# Patient Record
Sex: MALE | Race: WHITE | HISPANIC OR LATINO | Employment: FULL TIME | URBAN - METROPOLITAN AREA
[De-identification: names, ages, dates, MRNs, and addresses within clinical notes are randomized per-mention and may not be internally consistent; named-entity substitution may affect disease eponyms.]

---

## 2018-07-21 ENCOUNTER — HOSPITAL ENCOUNTER (EMERGENCY)
Facility: HOSPITAL | Age: 38
Discharge: HOME | End: 2018-07-21
Attending: EMERGENCY MEDICINE

## 2018-07-21 VITALS
RESPIRATION RATE: 18 BRPM | HEIGHT: 68 IN | DIASTOLIC BLOOD PRESSURE: 95 MMHG | HEART RATE: 96 BPM | WEIGHT: 245 LBS | BODY MASS INDEX: 37.13 KG/M2 | SYSTOLIC BLOOD PRESSURE: 150 MMHG | TEMPERATURE: 98 F | OXYGEN SATURATION: 97 %

## 2018-07-21 DIAGNOSIS — K57.92 DIVERTICULITIS: Primary | ICD-10-CM

## 2018-07-21 LAB
ALBUMIN SERPL BCP-MCNC: 4 G/DL
ALP SERPL-CCNC: 129 U/L
ALT SERPL W/O P-5'-P-CCNC: 51 U/L
ANION GAP SERPL CALC-SCNC: 11 MMOL/L
AST SERPL-CCNC: 30 U/L
BASOPHILS # BLD AUTO: 0.03 K/UL
BASOPHILS NFR BLD: 0.2 %
BILIRUB SERPL-MCNC: 0.9 MG/DL
BILIRUB UR QL STRIP: NEGATIVE
BUN SERPL-MCNC: 17 MG/DL
CALCIUM SERPL-MCNC: 9.5 MG/DL
CHLORIDE SERPL-SCNC: 103 MMOL/L
CLARITY UR: CLEAR
CO2 SERPL-SCNC: 23 MMOL/L
COLOR UR: YELLOW
CREAT SERPL-MCNC: 0.9 MG/DL
DIFFERENTIAL METHOD: ABNORMAL
EOSINOPHIL # BLD AUTO: 1.3 K/UL
EOSINOPHIL NFR BLD: 10 %
ERYTHROCYTE [DISTWIDTH] IN BLOOD BY AUTOMATED COUNT: 13.4 %
EST. GFR  (AFRICAN AMERICAN): >60 ML/MIN/1.73 M^2
EST. GFR  (NON AFRICAN AMERICAN): >60 ML/MIN/1.73 M^2
GLUCOSE SERPL-MCNC: 109 MG/DL
GLUCOSE UR QL STRIP: NEGATIVE
HCT VFR BLD AUTO: 47.6 %
HGB BLD-MCNC: 15.9 G/DL
HGB UR QL STRIP: NEGATIVE
KETONES UR QL STRIP: NEGATIVE
LEUKOCYTE ESTERASE UR QL STRIP: NEGATIVE
LIPASE SERPL-CCNC: 29 U/L
LYMPHOCYTES # BLD AUTO: 2.5 K/UL
LYMPHOCYTES NFR BLD: 19.8 %
MCH RBC QN AUTO: 30.6 PG
MCHC RBC AUTO-ENTMCNC: 33.4 G/DL
MCV RBC AUTO: 92 FL
MONOCYTES # BLD AUTO: 1.5 K/UL
MONOCYTES NFR BLD: 12 %
NEUTROPHILS # BLD AUTO: 7.4 K/UL
NEUTROPHILS NFR BLD: 58 %
NITRITE UR QL STRIP: NEGATIVE
PH UR STRIP: 6 [PH] (ref 5–8)
PLATELET # BLD AUTO: 229 K/UL
PMV BLD AUTO: 10.1 FL
POTASSIUM SERPL-SCNC: 3.9 MMOL/L
PROT SERPL-MCNC: 8.2 G/DL
PROT UR QL STRIP: NEGATIVE
RBC # BLD AUTO: 5.2 M/UL
SODIUM SERPL-SCNC: 137 MMOL/L
SP GR UR STRIP: 1.02 (ref 1–1.03)
URN SPEC COLLECT METH UR: NORMAL
UROBILINOGEN UR STRIP-ACNC: 1 EU/DL
WBC # BLD AUTO: 12.81 K/UL

## 2018-07-21 PROCEDURE — 80053 COMPREHEN METABOLIC PANEL: CPT

## 2018-07-21 PROCEDURE — 83690 ASSAY OF LIPASE: CPT

## 2018-07-21 PROCEDURE — 81003 URINALYSIS AUTO W/O SCOPE: CPT

## 2018-07-21 PROCEDURE — 25500020 PHARM REV CODE 255: Performed by: EMERGENCY MEDICINE

## 2018-07-21 PROCEDURE — 85025 COMPLETE CBC W/AUTO DIFF WBC: CPT

## 2018-07-21 PROCEDURE — 99284 EMERGENCY DEPT VISIT MOD MDM: CPT | Mod: 25

## 2018-07-21 RX ORDER — CIPROFLOXACIN 500 MG/1
500 TABLET ORAL 2 TIMES DAILY
Qty: 14 TABLET | Refills: 0 | Status: SHIPPED | OUTPATIENT
Start: 2018-07-21 | End: 2018-07-31

## 2018-07-21 RX ORDER — METRONIDAZOLE 500 MG/1
500 TABLET ORAL 3 TIMES DAILY
Qty: 21 TABLET | Refills: 0 | Status: SHIPPED | OUTPATIENT
Start: 2018-07-21 | End: 2018-07-28

## 2018-07-21 RX ADMIN — IOHEXOL 100 ML: 350 INJECTION, SOLUTION INTRAVENOUS at 10:07

## 2018-07-22 NOTE — ED NOTES
Patient identifiers for Ray Garrido checked and correct.  LOC: The patient is awake, alert and aware of environment with an appropriate affect, the patient is oriented x 3 and speaking appropriately.  APPEARANCE: Patient uncomfortable and in no acute distress, patient is clean and well groomed, patient's clothing are properly fastened.  SKIN: The skin is warm and dry, patient has normal skin turgor and moist mucus membranes, skin intact, no breakdown or brusing noted.  MUSKULOSKELETAL: Patient moving all extremities well, no obvious swelling or deformities noted.  RESPIRATORY: Airway is open and patent, respirations are spontaneous, patient has a normal effort and rate.  ABDOMEN: Tender to palpation.

## 2018-07-22 NOTE — ED PROVIDER NOTES
Encounter Date: 7/21/2018       History     Chief Complaint   Patient presents with    Abdominal Pain     Pt to ED with c/o right sided abdominal pain since yesterday. Pt denies NVD.      Patient is a 38-year-old male who denies past medical history who presents to ED with lower abdominal pain since yesterday.  Patient gives permission for friend to translate conversation.   Patient reports that the pain is more in the right lower quadrant. Patient associates fever and back pain. Patient denies history of any abdominal surgeries.  Patient denies any alleviating is aspirating factors.  Patient denies chills, neck pain/stiffness, headache, dizziness, SOB, chest pain, nausea, vomiting, diarrhea, dysuria, and hematuria.  Patient denies any other complaints at this time.      The history is provided by the patient.     Review of patient's allergies indicates:  No Known Allergies  History reviewed. No pertinent past medical history.  No past surgical history on file.  History reviewed. No pertinent family history.  Social History   Substance Use Topics    Smoking status: Not on file    Smokeless tobacco: Not on file    Alcohol use Not on file     Review of Systems   Constitutional: Positive for fever. Negative for chills.   HENT: Negative for sore throat.    Respiratory: Negative for shortness of breath.    Cardiovascular: Negative for chest pain.   Gastrointestinal: Positive for abdominal pain. Negative for diarrhea, nausea and vomiting.   Genitourinary: Negative for decreased urine volume, difficulty urinating, discharge, dysuria, flank pain, frequency, hematuria, penile pain, penile swelling, scrotal swelling, testicular pain and urgency.   Musculoskeletal: Positive for back pain. Negative for neck pain and neck stiffness.   Skin: Negative for rash.   Neurological: Negative for dizziness, syncope, facial asymmetry, speech difficulty, weakness, light-headedness, numbness and headaches.   Hematological: Does not  bruise/bleed easily.       Physical Exam     Initial Vitals [07/21/18 2114]   BP Pulse Resp Temp SpO2   (!) 155/89 103 20 100.1 °F (37.8 °C) 96 %      MAP       --         Physical Exam    Nursing note and vitals reviewed.  Constitutional: He appears well-developed and well-nourished. He is not diaphoretic. He is active and cooperative.  Non-toxic appearance. He does not have a sickly appearance.   Low-grade temperature    HENT:   Head: Normocephalic.   Nose: Nose normal.   Mouth/Throat: Oropharynx is clear and moist.   Eyes: Lids are normal.   Neck: Trachea normal, normal range of motion, full passive range of motion without pain and phonation normal. Neck supple.   Cardiovascular: Regular rhythm, normal heart sounds and normal pulses. Tachycardia present.    Pt hypertensive, denies any cardiac s/s.    Pulmonary/Chest: Effort normal and breath sounds normal.   Abdominal: Soft. Normal appearance and bowel sounds are normal. He exhibits no distension and no mass. There is tenderness in the right lower quadrant. There is guarding. There is no rigidity, no rebound, no CVA tenderness and no tenderness at McBurney's point.       Neurological: He is alert and oriented to person, place, and time. Gait normal. GCS eye subscore is 4. GCS verbal subscore is 5. GCS motor subscore is 6.   Skin: Skin is warm, dry and intact. Capillary refill takes less than 2 seconds. No rash noted.   Psychiatric: He has a normal mood and affect.         ED Course   Procedures  Labs Reviewed   CBC W/ AUTO DIFFERENTIAL - Abnormal; Notable for the following:        Result Value    WBC 12.81 (*)     Mono # 1.5 (*)     Eos # 1.3 (*)     Eosinophil% 10.0 (*)     All other components within normal limits   COMPREHENSIVE METABOLIC PANEL - Abnormal; Notable for the following:     ALT 51 (*)     All other components within normal limits   URINALYSIS, REFLEX TO URINE CULTURE    Narrative:     Preferred Collection Type->Urine, Clean Catch   LIPASE           Imaging Results          CT Abdomen Pelvis With Contrast (Final result)  Result time 07/21/18 23:09:09    Final result by Mundo Ag MD (07/21/18 23:09:09)                 Impression:      Extensive inflammatory changes in the right lower quadrant surrounding the sigmoid colon in a region of multiple diverticula consistent with acute diverticulitis.  No evidence of abscess or perforation.    Hepatomegaly with hepatic steatosis.      Electronically signed by: Mundo Ag MD  Date:    07/21/2018  Time:    23:09             Narrative:    EXAMINATION:  CT ABDOMEN PELVIS WITH CONTRAST    CLINICAL HISTORY:  Abdominal pain, unspecified;    TECHNIQUE:  Low dose axial images, sagittal and coronal reformations were obtained from the lung bases to the pubic symphysis following the IV administration of 100 mL of Omnipaque 350 .  Oral contrast was not given.    COMPARISON:  None.    FINDINGS:  The visualized portion of the heart is unremarkable.  The lung bases are clear.    Liver is enlarged measuring 21.5 cm and diffusely hypoattenuating in appearance suggestive for diffuse fatty infiltration.  There is no intra-or extrahepatic biliary ductal dilatation.  The gallbladder is unremarkable.  The stomach, pancreas, spleen, and adrenal glands are unremarkable.    Kidneys are functioning with no evidence of hydronephrosis.  Ureters, urinary bladder, and prostate are unremarkable.    Sigmoid colon is mildly tortuous and extends into the right lower quadrant.  Extensive surrounding inflammatory changes are seen surrounding the sigmoid colon in a region of multiple diverticula suggestive for acute diverticulitis.  Inflammatory changes extend to involve adjacent distal ileum.  Appendix is visualized and is unremarkable.  No evidence of bowel obstruction.  No evidence of perforation or abscess.  No free air or free fluid.    Aorta tapers normally.  Retroaortic left renal vein is incidentally noted.    No acute osseous  abnormality identified. Subcutaneous soft tissue structures are unremarkable.                                 Medical Decision Making:   History:   I obtained history from: someone other than patient.       <> Summary of History: Friend    Initial Assessment:   Patient presents to ED with lower abdominal pain since yesterday.  Patient reports the pain is more in right lower quadrant.  Patient appears well, nontoxic.  Patient has low-grade temp.  Normal bowel sounds. Abdomen soft.  Guarding noted. No rebound.   Differential Diagnosis:   Appendicitis, diverticulitis, SBO, pancreatitis  Clinical Tests:   Lab Tests: Ordered and Reviewed  Radiological Study: Ordered and Reviewed  ED Management:  CBC, CMP, lipase, urinalysis, CT abdomen/pelvis, IV   Labs unremarkable.  Urinalysis unremarkable.  CT reveals extensive inflammatory changes in the right lower quadrant surrounding the sigmoid colon in a region of multiple diverticula consistent with acute diverticulitis.  No evidence of abscess or perforation.  Patient is stable, no longer tachycardic, and will be DC home.  The patient will be treated for diverticulitis with prescriptions for Cipro and Flagyl.  Patient instructed to take all of prescribed antibiotics and to follow up with [facility] within 2-3 days.  Return precautions given.    Rx: cipro, flagyl                   Attending Attestation:     Physician Attestation Statement for NP/PA:   I discussed this assessment and plan of this patient with the NP/PA, but I did not personally examine the patient. The face to face encounter was performed by the NP/PA.    Other NP/PA Attestation Additions:    History of Present Illness: 38M with RLQ abd pain since yesterday.    Medical Decision Making: Work up shows acute diverticulitis.  No evidence of acute appendicitis  NO sepsis and pt is tolerating PO.  Treat with PO cipro/flagyl.                    Clinical Impression:   The encounter diagnosis  was Diverticulitis.                             Joe Holliday NP  07/21/18 4124       Renata White MD  07/26/18 6031

## 2018-07-22 NOTE — DISCHARGE INSTRUCTIONS
Please take prescribed antibiotics as labeled.  Follow-up with [facility] within 2-3 days and return to ED if symptoms worsen or change.

## 2018-07-22 NOTE — ED NOTES
Patient presents to ED with c/o rlq abdominal pain starting yesterday evening. Tender to palpation. Also complains of intermittent nausea without vomiting and subjective fever. No hx of any GI problems or surgeries. Denies any  symptoms. Denies chest pain. No shortness of breath. Pt is aaox4, neurologically intact.

## 2024-01-19 ENCOUNTER — HOSPITAL ENCOUNTER (EMERGENCY)
Facility: HOSPITAL | Age: 44
Discharge: HOME | End: 2024-01-19
Attending: EMERGENCY MEDICINE

## 2024-01-19 VITALS
TEMPERATURE: 98 F | DIASTOLIC BLOOD PRESSURE: 110 MMHG | OXYGEN SATURATION: 97 % | HEIGHT: 68 IN | BODY MASS INDEX: 37.89 KG/M2 | WEIGHT: 250 LBS | RESPIRATION RATE: 18 BRPM | SYSTOLIC BLOOD PRESSURE: 186 MMHG | HEART RATE: 90 BPM

## 2024-01-19 DIAGNOSIS — R07.9 CHEST PAIN: ICD-10-CM

## 2024-01-19 LAB
ALBUMIN SERPL BCP-MCNC: 4.4 G/DL (ref 3.5–5.2)
ALP SERPL-CCNC: 133 U/L (ref 55–135)
ALT SERPL W/O P-5'-P-CCNC: 72 U/L (ref 10–44)
ANION GAP SERPL CALC-SCNC: 12 MMOL/L (ref 8–16)
AST SERPL-CCNC: 60 U/L (ref 10–40)
BASOPHILS # BLD AUTO: 0.07 K/UL (ref 0–0.2)
BASOPHILS NFR BLD: 1.1 % (ref 0–1.9)
BILIRUB SERPL-MCNC: 0.9 MG/DL (ref 0.1–1)
BNP SERPL-MCNC: <10 PG/ML (ref 0–99)
BUN SERPL-MCNC: 14 MG/DL (ref 6–20)
CALCIUM SERPL-MCNC: 9.5 MG/DL (ref 8.7–10.5)
CHLORIDE SERPL-SCNC: 98 MMOL/L (ref 95–110)
CO2 SERPL-SCNC: 25 MMOL/L (ref 23–29)
CREAT SERPL-MCNC: 0.9 MG/DL (ref 0.5–1.4)
DIFFERENTIAL METHOD BLD: ABNORMAL
EOSINOPHIL # BLD AUTO: 1.2 K/UL (ref 0–0.5)
EOSINOPHIL NFR BLD: 19.1 % (ref 0–8)
ERYTHROCYTE [DISTWIDTH] IN BLOOD BY AUTOMATED COUNT: 12.1 % (ref 11.5–14.5)
EST. GFR  (NO RACE VARIABLE): >60 ML/MIN/1.73 M^2
GLUCOSE SERPL-MCNC: 199 MG/DL (ref 70–110)
HCT VFR BLD AUTO: 45 % (ref 40–54)
HGB BLD-MCNC: 16 G/DL (ref 14–18)
IMM GRANULOCYTES # BLD AUTO: 0.01 K/UL (ref 0–0.04)
IMM GRANULOCYTES NFR BLD AUTO: 0.2 % (ref 0–0.5)
LYMPHOCYTES # BLD AUTO: 2.3 K/UL (ref 1–4.8)
LYMPHOCYTES NFR BLD: 36.8 % (ref 18–48)
MCH RBC QN AUTO: 30.7 PG (ref 27–31)
MCHC RBC AUTO-ENTMCNC: 35.6 G/DL (ref 32–36)
MCV RBC AUTO: 86 FL (ref 82–98)
MONOCYTES # BLD AUTO: 0.4 K/UL (ref 0.3–1)
MONOCYTES NFR BLD: 6.8 % (ref 4–15)
NEUTROPHILS # BLD AUTO: 2.2 K/UL (ref 1.8–7.7)
NEUTROPHILS NFR BLD: 36 % (ref 38–73)
NRBC BLD-RTO: 0 /100 WBC
PLATELET # BLD AUTO: 229 K/UL (ref 150–450)
PMV BLD AUTO: 9.8 FL (ref 9.2–12.9)
POTASSIUM SERPL-SCNC: 4 MMOL/L (ref 3.5–5.1)
PROT SERPL-MCNC: 8.6 G/DL (ref 6–8.4)
RBC # BLD AUTO: 5.22 M/UL (ref 4.6–6.2)
SODIUM SERPL-SCNC: 135 MMOL/L (ref 136–145)
TROPONIN I SERPL DL<=0.01 NG/ML-MCNC: <0.006 NG/ML (ref 0–0.03)
WBC # BLD AUTO: 6.17 K/UL (ref 3.9–12.7)

## 2024-01-19 PROCEDURE — 84484 ASSAY OF TROPONIN QUANT: CPT | Performed by: PHYSICIAN ASSISTANT

## 2024-01-19 PROCEDURE — 85025 COMPLETE CBC W/AUTO DIFF WBC: CPT | Performed by: PHYSICIAN ASSISTANT

## 2024-01-19 PROCEDURE — 93010 ELECTROCARDIOGRAM REPORT: CPT | Mod: ,,, | Performed by: INTERNAL MEDICINE

## 2024-01-19 PROCEDURE — 83880 ASSAY OF NATRIURETIC PEPTIDE: CPT | Performed by: PHYSICIAN ASSISTANT

## 2024-01-19 PROCEDURE — 80053 COMPREHEN METABOLIC PANEL: CPT | Performed by: PHYSICIAN ASSISTANT

## 2024-01-19 PROCEDURE — 99285 EMERGENCY DEPT VISIT HI MDM: CPT | Mod: 25

## 2024-01-19 PROCEDURE — 93005 ELECTROCARDIOGRAM TRACING: CPT

## 2024-01-19 RX ORDER — NAPROXEN 500 MG/1
500 TABLET ORAL 2 TIMES DAILY WITH MEALS
Qty: 30 TABLET | Refills: 0 | Status: SHIPPED | OUTPATIENT
Start: 2024-01-19

## 2024-01-19 NOTE — ED PROVIDER NOTES
Encounter Date: 1/19/2024       History     Chief Complaint   Patient presents with    Chest Pain     Pt c/o left sided chest pain since 4am Thursday. The pain resolved on it's own later that day and restarted yesterday. The pain radiates to left shoulder and travels down left side of body. Pt denies any nausea or shortness breath. The pain has been constant since last night.      43-year-old male, denying significant past medical history, presents ED with concern of left-sided chest wall pain that began yesterday evening.  Denies any specific injury or trauma.  Pain is sharp, constant, is worse with touch or movements but also occurs at rest.  He did try ibuprofen for symptoms.  Denies any associated cough, shortness of breath, palpitations, lightheadedness or dizziness, abdominal pain, nausea, vomiting, urinary or bowel complaints.  No recent travel or surgery, leg swelling, history of DVT/PE.  Denies significant family history of cardiac complications.  No other acute complaints at this time.    The history is provided by the patient.     Review of patient's allergies indicates:  No Known Allergies  No past medical history on file.  No past surgical history on file.  No family history on file.     Review of Systems   Constitutional:  Negative for chills and fever.   HENT:  Negative for sore throat.    Respiratory:  Negative for cough and shortness of breath.    Cardiovascular:  Positive for chest pain. Negative for palpitations and leg swelling.   Gastrointestinal:  Negative for abdominal pain, diarrhea, nausea and vomiting.   Musculoskeletal:  Negative for back pain, neck pain and neck stiffness.       Physical Exam     Initial Vitals [01/19/24 1152]   BP Pulse Resp Temp SpO2   (!) 186/110 90 18 97.9 °F (36.6 °C) 97 %      MAP       --         Physical Exam    Nursing note and vitals reviewed.  Constitutional: He appears well-developed and well-nourished. He is active. He does not have a sickly appearance. He  does not appear ill. No distress.   HENT:   Head: Normocephalic and atraumatic.   Neck:   Normal range of motion.  Cardiovascular:  Normal rate, regular rhythm and normal heart sounds.           Pulmonary/Chest: Effort normal and breath sounds normal.   Patient is reporting tenderness with palpation of left anterior chest wall.  No skin changes or rashes.  Lungs are clear bilaterally.   Musculoskeletal:      Cervical back: Normal range of motion.     Neurological: He is alert. GCS eye subscore is 4. GCS verbal subscore is 5. GCS motor subscore is 6.   Skin: Skin is warm and dry.   Psychiatric: He has a normal mood and affect. His speech is normal and behavior is normal.         ED Course   Procedures  Labs Reviewed   CBC W/ AUTO DIFFERENTIAL - Abnormal; Notable for the following components:       Result Value    Eos # 1.2 (*)     Gran % 36.0 (*)     Eosinophil % 19.1 (*)     All other components within normal limits   COMPREHENSIVE METABOLIC PANEL - Abnormal; Notable for the following components:    Sodium 135 (*)     Glucose 199 (*)     Total Protein 8.6 (*)     AST 60 (*)     ALT 72 (*)     All other components within normal limits   TROPONIN I   B-TYPE NATRIURETIC PEPTIDE        ECG Results              EKG 12-lead (In process)  Result time 01/19/24 14:11:10      In process by Interface, Lab In [facility] (01/19/24 14:11:10)                   Narrative:    Test Reason : R07.9,    Vent. Rate : 081 BPM     Atrial Rate : 081 BPM     P-R Int : 162 ms          QRS Dur : 152 ms      QT Int : 414 ms       P-R-T Axes : 052 -65 012 degrees     QTc Int : 480 ms    Normal sinus rhythm  Right bundle branch block  Left anterior fascicular block   Bifascicular block   Minimal voltage criteria for LVH, may be normal variant  Abnormal ECG  No previous ECGs available    Referred By: [email or web address]   SELF           Confirmed By:                                   Imaging Results              X-Ray Chest AP Portable (Final result)  Result  time 01/19/24 13:08:52      Final result by Tim England MD (01/19/24 13:08:52)                   Impression:      No acute findings.      Electronically signed by: Tim England MD  Date:    01/19/2024  Time:    13:08               Narrative:    EXAMINATION:  XR CHEST AP PORTABLE    CLINICAL HISTORY:  Chest Pain;    TECHNIQUE:  Single frontal view of the chest was performed.    COMPARISON:  None    FINDINGS:  Cardiomediastinal contour is within normal limits.The lungs are grossly clear.  No pneumothorax.No pleural effusions.                                       Medications - No data to display  Medical Decision Making  Patient presents with concern of left-sided chest pain that began yesterday evening, described as constant, sharp, worse with activities or touch but also occurring at rest.  Afebrile.  He was reporting reproducible tenderness to left anterior chest wall.  Lungs are clear.    DDx:  Including but not limited to musculoskeletal, costochondritis, pleurisy, pneumonia, pneumothorax, pleural effusion, ACS, PE    Amount and/or Complexity of Data Reviewed  Labs: ordered. Decision-making details documented in ED Course.  Radiology: ordered. Decision-making details documented in ED Course.    Risk  Prescription drug management.      Additional MDM:   PERC Rule:   Age is greater than or equal to 50 = 0.0  Heart Rate is greater than or equal to 100 = 0.0  SaO2 on room air < 95% = 0.0  Unilateral leg swelling = 0.0  Hemoptysis = 0.0  Recent surgery or trauma = 0.0  Prior PE or DVT =  0.0  Hormone use = 0.00  PERC Score = 0              ED Course as of 01/19/24 1422   Fri Jan 19, 2024   1323 Troponin I #1  WNL [KS]   1323 CBC auto differential(!)  Unremarkable [KS]   1333 Comprehensive metabolic panel(!)  Hyperglycemia 199. Slight elevation LFT. Total bili WNL. Grossly unremarkable [KS]   1334 X-Ray Chest AP Portable  No acute cardiopulmonary findings [KS]   1415 BNP  WNL [KS]   1418 Patient continues to rest  comfortably and in no apparent distress.  Results were discussed. Low heart score. PERC negative.  Lower concern for acute cardiac event.  I do have higher suspicion symptoms are likely musculoskeletal as patient is reporting pain symptoms are worsened with certain movements and with touch.  Will treat with NSAID.  He was encouraged have close follow-up with PCP for re-evaluation.  ED return precautions were discussed at length.  Patient states his understanding and agrees with plan. [KS]   1419 Patient discussed with attending, Dr. Núñez, who agrees with ED course and dispo. [KS]      ED Course User Index  [KS] Lazaro Chiang PA-C                             Clinical Impression:  Final diagnoses:  [R07.9] Chest pain          ED Disposition Condition    Discharge Stable          ED Prescriptions       Medication Sig Dispense Start Date End Date Auth. Provider    naproxen (NAPROSYN) 500 MG tablet Take 1 tablet (500 mg total) by mouth 2 (two) times daily with meals. 30 tablet 1/19/2024 -- Lazaro Chiang PA-C          Follow-up Information       Follow up With Specialties Details Why Contact Info Additional Information    Your Doctor         [facility]   [address], [address]  [facility] [address] [phone] Please park in [address] or [address] and use Can [address]tty. Take Medical Office Bldg. elevators.             Lazaro Chiang PA-C  01/19/24 1421       Lazaro Chiang PA-C  01/19/24 1422

## 2024-01-19 NOTE — DISCHARGE INSTRUCTIONS
